# Patient Record
Sex: FEMALE | Race: WHITE | Employment: OTHER | URBAN - METROPOLITAN AREA
[De-identification: names, ages, dates, MRNs, and addresses within clinical notes are randomized per-mention and may not be internally consistent; named-entity substitution may affect disease eponyms.]

---

## 2017-05-22 PROBLEM — B37.0 ORAL CANDIDIASIS: Status: ACTIVE | Noted: 2017-05-22

## 2017-06-14 PROBLEM — B37.0 ORAL CANDIDIASIS: Status: RESOLVED | Noted: 2017-05-22 | Resolved: 2017-06-14

## 2017-11-14 ENCOUNTER — ANESTHESIA EVENT (OUTPATIENT)
Dept: SURGERY | Age: 82
End: 2017-11-14
Payer: MEDICARE

## 2017-11-14 NOTE — H&P
Outpatient Surgery History and Physical      Sen Galaviz was seen and examined. Chief Complaint:   RETAINED HARDWARE LEFT ELBOW     Physical Exam:   There were no vitals taken for this visit. Heart:   Regular rhythm      Lungs:  Are clear      History:  Past Medical History:   Diagnosis Date    COPD (chronic obstructive pulmonary disease) ([address]     daily inhaler--- no home o2-- followed by dr Angela Banuelos hypertension, benign 03/24/2015    controlled with med    Generalized osteoarthrosis, unspecified site 3/24/2015    Legal blindness     bilat    Lumbago 3/24/2015    Macular degeneration     Other malaise and fatigue 3/24/2015    Other psoriasis     on head    Pain in joint, shoulder region     Raynaud's syndrome     Right bundle branch block 3/24/2015    Seborrheic dermatitis, unspecified     Spinal stenosis, lumbar region, without neurogenic claudication 3/24/2015    Tobacco use disorder 3/24/2015    Underweight     Unspecified hearing loss 3/24/2015    Unspecified hypothyroidism 3/24/2015    Unspecified vertiginous syndromes and labyrinthine disorders     Urinary frequency 3/24/2015      Past Surgical History:   Procedure Laterality Date    HX HYSTERECTOMY      HX ORTHOPAEDIC Left 10/31/2016    elbow surg    HX ORTHOPAEDIC Bilateral 2013    hip pinning--- related to fall    HX OTHER SURGICAL      Thyroid Surgery    HX OTHER SURGICAL      colonoscopy    HX SEPTOPLASTY       Family History   Problem Relation Age of Onset    Heart Disease Father       Social History     Occupational History    Not on file.      Social History Main Topics    Smoking status: Current Every Day Smoker     Packs/day: 0.50     Years: 64.00    Smokeless tobacco: Never Used    Alcohol use Yes      Comment: occ    Drug use: No    Sexual activity: Not on file       Allergies: Reviewed per EMR  Allergies   Allergen Reactions    Adhesive Rash and Itching    Axid [Nizatidine] Rash    Bee Sting [Sting, Bee] Other (comments)     Severe swelling         Medications:    Prior to Admission medications    Medication Sig Start Date End Date Taking? Authorizing Provider   glycopyrrolate-formoterol (BEVESPI AEROSPHERE) 9-4.8 mcg HFAA Take  by inhalation two (2) times a day. Historical Provider   ipratropium-albuterol (COMBIVENT RESPIMAT)  mcg/actuation inhaler INHALE 1 PUFF FOUR TIMES A DAY  Patient taking differently: Take 1 Puff by inhalation every four (4) hours as needed. INHALE 1 PUFF FOUR TIMES A DAY 8/29/17   Nereida Deng MD   lisinopril (PRINIVIL, ZESTRIL) 20 mg tablet Take 1 Tab by mouth daily. Patient taking differently: Take 20 mg by mouth every morning. 7/11/17   Nereida Deng MD   levothyroxine (SYNTHROID) 88 mcg tablet Take 1 Tab by mouth Daily (before breakfast). 6/14/17   Nereida Deng MD   albuterol-ipratropium (DUO-NEB) 2.5 mg-0.5 mg/3 ml nebulizer solution 3 mL by Nebulization route three (3) times daily. Patient taking differently: 3 mL by Nebulization route every six (6) hours as needed. 2/26/16   Ariana Walters MD   acetaminophen (TYLENOL) 325 mg tablet Take 2 Tabs by mouth every eight (8) hours. Patient taking differently: Take 650 mg by mouth every six (6) hours as needed. 9/24/15   Joanna Dyson NP        The surgery is planned for HARDWARE REMOVAL LEFT ELBOW. The patient is here today for outpatient surgery. I have examined the patient, no changes are noted in the patient's medical status. Necessity for the procedure/care is still present and the history and physical above is current.       Signed By: Elda Martinez NP     November 14, 2017 4:45 PM

## 2017-11-15 ENCOUNTER — APPOINTMENT (OUTPATIENT)
Dept: GENERAL RADIOLOGY | Age: 82
End: 2017-11-15
Attending: ORTHOPAEDIC SURGERY
Payer: MEDICARE

## 2017-11-15 ENCOUNTER — ANESTHESIA (OUTPATIENT)
Dept: SURGERY | Age: 82
End: 2017-11-15
Payer: MEDICARE

## 2017-11-15 ENCOUNTER — HOSPITAL ENCOUNTER (OUTPATIENT)
Age: 82
Setting detail: OUTPATIENT SURGERY
Discharge: HOME | End: 2017-11-15
Attending: ORTHOPAEDIC SURGERY | Admitting: ORTHOPAEDIC SURGERY
Payer: MEDICARE

## 2017-11-15 VITALS
RESPIRATION RATE: 22 BRPM | HEART RATE: 72 BPM | SYSTOLIC BLOOD PRESSURE: 181 MMHG | DIASTOLIC BLOOD PRESSURE: 94 MMHG | HEIGHT: 67 IN | OXYGEN SATURATION: 87 % | BODY MASS INDEX: 14.91 KG/M2 | WEIGHT: 95 LBS | TEMPERATURE: 97.5 F

## 2017-11-15 DIAGNOSIS — Z96.9 RETAINED ORTHOPEDIC HARDWARE: Primary | ICD-10-CM

## 2017-11-15 PROCEDURE — 77030018836 HC SOL IRR NACL ICUM -A: Performed by: ORTHOPAEDIC SURGERY

## 2017-11-15 PROCEDURE — 88304 TISSUE EXAM BY PATHOLOGIST: CPT | Performed by: ORTHOPAEDIC SURGERY

## 2017-11-15 PROCEDURE — 76210000020 HC REC RM PH II FIRST 0.5 HR: Performed by: ORTHOPAEDIC SURGERY

## 2017-11-15 PROCEDURE — 73070 X-RAY EXAM OF ELBOW: CPT

## 2017-11-15 PROCEDURE — 77030008467 HC STPLR SKN COVD -B: Performed by: ORTHOPAEDIC SURGERY

## 2017-11-15 PROCEDURE — 76942 ECHO GUIDE FOR BIOPSY: CPT | Performed by: ORTHOPAEDIC SURGERY

## 2017-11-15 PROCEDURE — 77030020782 HC GWN BAIR PAWS FLX 3M -B: Performed by: ANESTHESIOLOGY

## 2017-11-15 PROCEDURE — 77030011640 HC PAD GRND REM COVD -A: Performed by: ORTHOPAEDIC SURGERY

## 2017-11-15 PROCEDURE — 76010000161 HC OR TIME 1 TO 1.5 HR INTENSV-TIER 1: Performed by: ORTHOPAEDIC SURGERY

## 2017-11-15 PROCEDURE — L3650 SO 8 ABD RESTRAINT PRE OTS: HCPCS | Performed by: ORTHOPAEDIC SURGERY

## 2017-11-15 PROCEDURE — 74011250636 HC RX REV CODE- 250/636: Performed by: ANESTHESIOLOGY

## 2017-11-15 PROCEDURE — 94640 AIRWAY INHALATION TREATMENT: CPT

## 2017-11-15 PROCEDURE — 74011250636 HC RX REV CODE- 250/636

## 2017-11-15 PROCEDURE — 74011000250 HC RX REV CODE- 250

## 2017-11-15 PROCEDURE — 74011250636 HC RX REV CODE- 250/636: Performed by: NURSE PRACTITIONER

## 2017-11-15 PROCEDURE — 76060000033 HC ANESTHESIA 1 TO 1.5 HR: Performed by: ORTHOPAEDIC SURGERY

## 2017-11-15 PROCEDURE — 76210000016 HC OR PH I REC 1 TO 1.5 HR: Performed by: ORTHOPAEDIC SURGERY

## 2017-11-15 PROCEDURE — 74011000250 HC RX REV CODE- 250: Performed by: ANESTHESIOLOGY

## 2017-11-15 PROCEDURE — 77030003602 HC NDL NRV BLK BBMI -B: Performed by: ANESTHESIOLOGY

## 2017-11-15 PROCEDURE — 76010010054 HC POST OP PAIN BLOCK: Performed by: ORTHOPAEDIC SURGERY

## 2017-11-15 RX ORDER — SODIUM CHLORIDE, SODIUM LACTATE, POTASSIUM CHLORIDE, CALCIUM CHLORIDE 600; 310; 30; 20 MG/100ML; MG/100ML; MG/100ML; MG/100ML
75 INJECTION, SOLUTION INTRAVENOUS
Status: DISCONTINUED | OUTPATIENT
Start: 2017-11-15 | End: 2017-11-15 | Stop reason: HOSPADM

## 2017-11-15 RX ORDER — CEFAZOLIN SODIUM IN 0.9 % NACL 2 G/50 ML
2 INTRAVENOUS SOLUTION, PIGGYBACK (ML) INTRAVENOUS
Status: COMPLETED | OUTPATIENT
Start: 2017-11-15 | End: 2017-11-15

## 2017-11-15 RX ORDER — SODIUM CHLORIDE, SODIUM LACTATE, POTASSIUM CHLORIDE, CALCIUM CHLORIDE 600; 310; 30; 20 MG/100ML; MG/100ML; MG/100ML; MG/100ML
75 INJECTION, SOLUTION INTRAVENOUS CONTINUOUS
Status: DISCONTINUED | OUTPATIENT
Start: 2017-11-15 | End: 2017-11-15 | Stop reason: HOSPADM

## 2017-11-15 RX ORDER — IPRATROPIUM BROMIDE AND ALBUTEROL SULFATE 2.5; .5 MG/3ML; MG/3ML
3 SOLUTION RESPIRATORY (INHALATION)
Status: COMPLETED | OUTPATIENT
Start: 2017-11-15 | End: 2017-11-15

## 2017-11-15 RX ORDER — PROPOFOL 10 MG/ML
INJECTION, EMULSION INTRAVENOUS
Status: DISCONTINUED | OUTPATIENT
Start: 2017-11-15 | End: 2017-11-15 | Stop reason: HOSPADM

## 2017-11-15 RX ORDER — LIDOCAINE HYDROCHLORIDE 20 MG/ML
INJECTION, SOLUTION EPIDURAL; INFILTRATION; INTRACAUDAL; PERINEURAL AS NEEDED
Status: DISCONTINUED | OUTPATIENT
Start: 2017-11-15 | End: 2017-11-15 | Stop reason: HOSPADM

## 2017-11-15 RX ORDER — LIDOCAINE HYDROCHLORIDE 10 MG/ML
0.1 INJECTION INFILTRATION; PERINEURAL AS NEEDED
Status: DISCONTINUED | OUTPATIENT
Start: 2017-11-15 | End: 2017-11-15 | Stop reason: HOSPADM

## 2017-11-15 RX ORDER — MIDAZOLAM HYDROCHLORIDE 1 MG/ML
2 INJECTION, SOLUTION INTRAMUSCULAR; INTRAVENOUS ONCE
Status: DISCONTINUED | OUTPATIENT
Start: 2017-11-15 | End: 2017-11-15 | Stop reason: HOSPADM

## 2017-11-15 RX ORDER — FENTANYL CITRATE 50 UG/ML
100 INJECTION, SOLUTION INTRAMUSCULAR; INTRAVENOUS ONCE
Status: COMPLETED | OUTPATIENT
Start: 2017-11-15 | End: 2017-11-15

## 2017-11-15 RX ORDER — HYDROMORPHONE HYDROCHLORIDE 2 MG/ML
0.5 INJECTION, SOLUTION INTRAMUSCULAR; INTRAVENOUS; SUBCUTANEOUS
Status: DISCONTINUED | OUTPATIENT
Start: 2017-11-15 | End: 2017-11-15 | Stop reason: HOSPADM

## 2017-11-15 RX ORDER — ROPIVACAINE HYDROCHLORIDE 5 MG/ML
INJECTION, SOLUTION EPIDURAL; INFILTRATION; PERINEURAL AS NEEDED
Status: DISCONTINUED | OUTPATIENT
Start: 2017-11-15 | End: 2017-11-15 | Stop reason: HOSPADM

## 2017-11-15 RX ORDER — PROPOFOL 10 MG/ML
INJECTION, EMULSION INTRAVENOUS AS NEEDED
Status: DISCONTINUED | OUTPATIENT
Start: 2017-11-15 | End: 2017-11-15 | Stop reason: HOSPADM

## 2017-11-15 RX ORDER — OXYCODONE HYDROCHLORIDE 5 MG/1
5 TABLET ORAL
Status: DISCONTINUED | OUTPATIENT
Start: 2017-11-15 | End: 2017-11-15 | Stop reason: HOSPADM

## 2017-11-15 RX ORDER — OXYCODONE HYDROCHLORIDE 5 MG/1
10 TABLET ORAL
Status: DISCONTINUED | OUTPATIENT
Start: 2017-11-15 | End: 2017-11-15 | Stop reason: HOSPADM

## 2017-11-15 RX ORDER — METOPROLOL TARTRATE 5 MG/5ML
2.5 INJECTION INTRAVENOUS ONCE
Status: COMPLETED | OUTPATIENT
Start: 2017-11-15 | End: 2017-11-15

## 2017-11-15 RX ADMIN — FENTANYL CITRATE 100 MCG: 50 INJECTION INTRAMUSCULAR; INTRAVENOUS at 10:32

## 2017-11-15 RX ADMIN — PROPOFOL 75 MCG/KG/MIN: 10 INJECTION, EMULSION INTRAVENOUS at 11:49

## 2017-11-15 RX ADMIN — LIDOCAINE HYDROCHLORIDE 40 MG: 20 INJECTION, SOLUTION EPIDURAL; INFILTRATION; INTRACAUDAL; PERINEURAL at 11:49

## 2017-11-15 RX ADMIN — PROPOFOL 30 MG: 10 INJECTION, EMULSION INTRAVENOUS at 11:49

## 2017-11-15 RX ADMIN — CEFAZOLIN 2 G: 1 INJECTION, POWDER, FOR SOLUTION INTRAMUSCULAR; INTRAVENOUS; PARENTERAL at 11:51

## 2017-11-15 RX ADMIN — SODIUM CHLORIDE, SODIUM LACTATE, POTASSIUM CHLORIDE, AND CALCIUM CHLORIDE 75 ML/HR: 600; 310; 30; 20 INJECTION, SOLUTION INTRAVENOUS at 08:04

## 2017-11-15 RX ADMIN — ROPIVACAINE HYDROCHLORIDE 30 ML: 5 INJECTION, SOLUTION EPIDURAL; INFILTRATION; PERINEURAL at 10:26

## 2017-11-15 RX ADMIN — IPRATROPIUM BROMIDE AND ALBUTEROL SULFATE 3 ML: 2.5; .5 SOLUTION RESPIRATORY (INHALATION) at 13:48

## 2017-11-15 RX ADMIN — PROPOFOL 10 MG: 10 INJECTION, EMULSION INTRAVENOUS at 12:02

## 2017-11-15 RX ADMIN — METOPROLOL TARTRATE 2.5 MG: 5 INJECTION INTRAVENOUS at 13:52

## 2017-11-15 NOTE — IP AVS SNAPSHOT
[address] [address] 
[phone] Patient: Elaine Long MRN: [id number] BGM:[birth year] About your hospitalization You were admitted on:  November 15, 2017 You last received care in the:  [facility] PACU You were discharged on:  November 15, 2017 Why you were hospitalized Your primary diagnosis was:  Not on File Things You Need To Do (next 8 weeks) Follow up with Reinier Grimes MD  
  
Phone:  [phone] Where:  [address] [address] [facility] [address] Tuesday Nov 28, 2017 Follow up with Binu Hernandez MD  
@ 6749 Phone:  [phone] Where:  [address], [address], [facility] [address] Discharge Orders Procedure Order Date Status Priority Quantity Spec Type Associated Dx CALL YOUR DOCTOR For: Severe uncontrolled pain. , Redness, tenderness, or signs of infection. 11/15/17 0828 Normal Routine 1  Retained orthopedic hardware [[id number]] Questions: For:  Severe uncontrolled pain. For:  Redness, tenderness, or signs of infection. ACTIVITY AFTER DISCHARGE Patient should: Restrict lifting 11/15/17 0828 Normal Routine 1  Retained orthopedic hardware [[id number]] Questions: Patient should:  Restrict lifting DIET REGULAR No added salt 11/15/17 0828 Normal Routine 1  Retained orthopedic hardware [[id number]] Questions: Additional options:  No added salt DRESSING, DO NOT REMOVE 11/15/17 0828 Normal Routine 1  Retained orthopedic hardware [[id number]] Comments:  Keep clean, dry and intact until next clinic visit. A check celestine indicates which time of day the medication should be taken. My Medications TAKE these medications as instructed Instructions Each Dose to Equal  
 Morning Noon Evening Bedtime  
 acetaminophen 325 mg tablet Commonly known as:  TYLENOL  
   
 Your last dose was: Your next dose is: Take 2 Tabs by mouth every eight (8) hours. 650 mg  
    
   
   
   
  
 * albuterol-ipratropium 2.5 mg-0.5 mg/3 ml Nebu Commonly known as:  Jefry Mayberry Your last dose was: Your next dose is:    
   
   
 3 mL by Nebulization route three (3) times daily. 3 mL * ipratropium-albuterol  mcg/actuation inhaler Commonly known as:  Darrick Bentleyam Your last dose was: Your next dose is:    
   
   
 INHALE 1 PUFF FOUR TIMES A DAY BEVESPI AEROSPHERE 9-4.8 mcg Hfaa Generic drug:  glycopyrrolate-formoterol Your last dose was: Your next dose is: Take  by inhalation two (2) times a day. levothyroxine 88 mcg tablet Commonly known as:  SYNTHROID Your last dose was: Your next dose is: Take 1 Tab by mouth Daily (before breakfast). 88 mcg  
    
   
   
   
  
 lisinopril 20 mg tablet Commonly known as:  Margarita [place] Your last dose was: Your next dose is: Take 1 Tab by mouth daily. 20 mg  
    
   
   
   
  
 * Notice: This list has 2 medication(s) that are the same as other medications prescribed for you. Read the directions carefully, and ask your doctor or other care provider to review them with you. Discharge Instructions NO LIFTING WITH LEFT ARM 
LEFT ARM SLING 
ELEVATE LEFT ARM  
KEEP DRESSING CLEAN AND DRY FOLLOW-UP APPT WITH DR Benton Rosales - 11/28/17 @ 10:20 AM 
PRESCRIPTION FOR OXYCODONE 5 MG GIVEN TO PATIENT 
ACTIVITY · As tolerated and as directed by your doctor. · You may shower in 24 hours. Do not take a bath until cleared by MD.  
 
DIET · Clear liquids until no nausea or vomiting; then light diet for the first day. · Advance to regular diet on second day, unless your doctor orders otherwise. · If nausea and vomiting continues, call your doctor. PAIN 
· Take pain medication as directed by your doctor. · Call your doctor if pain is NOT relieved by medication. · DO NOT take aspirin of blood thinners unless directed by your doctor. CALL YOUR DOCTOR IF  
· Excessive bleeding that does not stop after holding pressure over the area · Temperature of 101 degrees F or above · Excessive redness, swelling or bruising, and/ or green or yellow, smelly discharge from incision AFTER ANESTHESIA · For the first 24 hours: DO NOT Drive, Drink alcoholic beverages, or Make important decisions. · Be aware of dizziness following anesthesia and while taking pain medication. · Limit your activities · Do not operate hazardous machinery · If you have not urinated within 8 hours after discharge, please contact your surgeon on call. *  Please give a list of your current medications to your Primary Care Provider. *  Please update this list whenever your medications are discontinued, doses are 
    changed, or new medications (including over-the-counter products) are added. *  Please carry medication information at all times in case of emergency situations. These are general instructions for a healthy lifestyle: No smoking/ No tobacco products/ Avoid exposure to second hand smoke Surgeon General's Warning:  Quitting smoking now greatly reduces serious risk to your health. Obesity, smoking, and sedentary lifestyle greatly increases your risk for illness A healthy diet, regular physical exercise & weight monitoring are important for maintaining a healthy lifestyle You may be retaining fluid if you have a history of heart failure or if you experience any of the following symptoms:  Weight gain of 3 pounds or more overnight or 5 pounds in a week, increased swelling in our hands or feet or shortness of breath while lying flat in bed.   Please call your doctor as soon as you notice any of these symptoms; do not wait until your next office visit. Recognize signs and symptoms of STROKE: 
 
F-face looks uneven A-arms unable to move or move unevenly S-speech slurred or non-existent T-time-call 911 as soon as signs and symptoms begin-DO NOT go Back to bed or wait to see if you get better-TIME IS BRAIN. ACO Transitions of Care Introducing [employer] [address] Lilliana Lewis offers a voluntary care coordination program to provide high quality service and care to [facility] fee-for-service beneficiaries. Arpita Anderson was designed to help you enhance your health and well-being through the following services: ? Transitions of Care  support for individuals who are transitioning from one care setting to another (example: Hospital to home). ? Chronic and Complex Care Coordination  support for individuals and caregivers of those with serious or chronic illnesses or with more than one chronic (ongoing) condition and those who take a number of different medications. If you meet specific medical criteria, a [facility] [address] may call you directly to coordinate your care with your primary care physician and your other care providers. For questions about the [facility] programs, please, contact your physicians office. For general questions or additional information about Accountable Care Organizations: 
Please visit www.medicare.gov/acos. html or call 1-800-MEDICARE (9-414.576.4988) TTY users should call 8-165.977.6252. Introducing [facility]! Candelaria Allan introduces [employer] patient portal. Now you can access parts of your medical record, email your doctor's office, and request medication refills online. 1. In your internet browser, go to [email or web address] 2. Click on the First Time User? Click Here link in the Sign In box.  You will see the New Member Sign Up page. 3. Enter your [employer] Access Code exactly as it appears below. You will not need to use this code after youve completed the sign-up process. If you do not sign up before the expiration date, you must request a new code. · [employer] Access Code: [email or web address] Expires: 2/13/2018  5:26 AM 
 
4. Enter the last four digits of your Social Security Number (xxxx) and Date of Birth (mm/dd/yyyy) as indicated and click Submit. You will be taken to the next sign-up page. 5. Create a [employer] ID. This will be your [employer] login ID and cannot be changed, so think of one that is secure and easy to remember. 6. Create a [employer] password. You can change your password at any time. 7. Enter your Password Reset Question and Answer. This can be used at a later time if you forget your password. 8. Enter your e-mail address. You will receive e-mail notification when new information is available in [address]. 9. Click Sign Up. You can now view and download portions of your medical record. 10. Click the Download Summary menu link to download a portable copy of your medical information. If you have questions, please visit the Frequently Asked Questions section of the [employer] website. Remember, [employer] is NOT to be used for urgent needs. For medical emergencies, dial 911. Now available from your iPhone and Android! Unresulted Labs-Please follow up with your PCP about these lab tests Order Current Status XR ELBOW LT AP/LAT In process Providers Seen During Your Hospitalization Provider Specialty Primary office phone Sophia Hernandez MD Orthopedic Surgery [phone] Your Primary Care Physician (PCP) Primary Care Physician Office Phone Office Fax [address], [address] [phone] You are allergic to the following Allergen Reactions Adhesive Rash Itching Axid (Nizatidine) Rash Bee Sting (Sting, Bee) Other (comments) Severe swelling Recent Documentation Height Weight BMI OB Status Smoking Status 1.702 m 43.1 kg 14.88 kg/m2 Hysterectomy Current Every Day Smoker Emergency Contacts Name Discharge Info Relation Home Work Mobile Akin Myrick  Spouse [3] [phone] Kaushik Lira  Child [2] [phone] Patient Belongings The following personal items are in your possession at time of discharge: 
  Dental Appliances: None  Visual Aid: Glasses      Home Medications: None   Jewelry: None  Clothing: Shirt, Pants, Socks, Undergarments, Footwear, Sweater    Other Valuables: None Please provide this summary of care documentation to your next provider. Signatures-by signing, you are acknowledging that this After Visit Summary has been reviewed with you and you have received a copy. Patient Signature:  ____________________________________________________________ Date:  ____________________________________________________________  
  
Josph Perfect Provider Signature:  ____________________________________________________________ Date:  ____________________________________________________________

## 2017-11-15 NOTE — DISCHARGE INSTRUCTIONS
NO LIFTING WITH LEFT ARM  LEFT ARM SLING  ELEVATE LEFT ARM   KEEP DRESSING CLEAN AND DRY  FOLLOW-UP APPT WITH DR Natalee Rodriguez - 11/28/17 @ 10:20 AM  PRESCRIPTION FOR OXYCODONE 5 MG GIVEN TO PATIENT  ACTIVITY  · As tolerated and as directed by your doctor. · You may shower in 24 hours. Do not take a bath until cleared by MD.     DIET  · Clear liquids until no nausea or vomiting; then light diet for the first day. · Advance to regular diet on second day, unless your doctor orders otherwise. · If nausea and vomiting continues, call your doctor. PAIN  · Take pain medication as directed by your doctor. · Call your doctor if pain is NOT relieved by medication. · DO NOT take aspirin of blood thinners unless directed by your doctor. CALL YOUR DOCTOR IF   · Excessive bleeding that does not stop after holding pressure over the area  · Temperature of 101 degrees F or above  · Excessive redness, swelling or bruising, and/ or green or yellow, smelly discharge from incision    AFTER ANESTHESIA   · For the first 24 hours: DO NOT Drive, Drink alcoholic beverages, or Make important decisions. · Be aware of dizziness following anesthesia and while taking pain medication. · Limit your activities  · Do not operate hazardous machinery  · If you have not urinated within 8 hours after discharge, please contact your surgeon on call. *  Please give a list of your current medications to your Primary Care Provider. *  Please update this list whenever your medications are discontinued, doses are      changed, or new medications (including over-the-counter products) are added. *  Please carry medication information at all times in case of emergency situations. These are general instructions for a healthy lifestyle:  No smoking/ No tobacco products/ Avoid exposure to second hand smoke  Surgeon General's Warning:  Quitting smoking now greatly reduces serious risk to your health.   Obesity, smoking, and sedentary lifestyle greatly increases your risk for illness  A healthy diet, regular physical exercise & weight monitoring are important for maintaining a healthy lifestyle    You may be retaining fluid if you have a history of heart failure or if you experience any of the following symptoms:  Weight gain of 3 pounds or more overnight or 5 pounds in a week, increased swelling in our hands or feet or shortness of breath while lying flat in bed. Please call your doctor as soon as you notice any of these symptoms; do not wait until your next office visit. Recognize signs and symptoms of STROKE:    F-face looks uneven  A-arms unable to move or move unevenly  S-speech slurred or non-existent  T-time-call 911 as soon as signs and symptoms begin-DO NOT go       Back to bed or wait to see if you get better-TIME IS BRAIN.

## 2017-11-15 NOTE — ANESTHESIA PREPROCEDURE EVALUATION
Anesthetic History   No history of anesthetic complications            Review of Systems / Medical History  Patient summary reviewed, nursing notes reviewed and pertinent labs reviewed    Pulmonary    COPD (no home O2, stable): mild      Smoker         Neuro/Psych         Psychiatric history    Comments: Spinal stenosis Cardiovascular    Hypertension: well controlled              Exercise tolerance: <4 METS     GI/Hepatic/Renal  Within defined limits              Endo/Other      Hypothyroidism  Arthritis and anemia     Other Findings   Comments: Raynaud's         Physical Exam    Airway  Mallampati: II  TM Distance: 4 - 6 cm  Neck ROM: normal range of motion   Mouth opening: Normal     Cardiovascular  Regular rate and rhythm,  S1 and S2 normal,  no murmur, click, rub, or gallop  Rhythm: regular  Rate: normal        Comments: Slightly tachy Dental    Dentition: Full upper dentures and Full lower dentures     Pulmonary            Prolonged expiration     Abdominal  GI exam deferred       Other Findings            Anesthetic Plan    ASA: 3  Anesthesia type: total IV anesthesia - supraclavicular block      Post-op pain plan if not by surgeon: peripheral nerve block single    Induction: Intravenous  Anesthetic plan and risks discussed with: Patient and Family

## 2017-11-15 NOTE — BRIEF OP NOTE
BRIEF OPERATIVE NOTE    Date of Procedure: 11/15/2017   Preoperative Diagnosis: Presence of retained hardware [Z96.9]  Postoperative Diagnosis: Presence of retained hardware [Z96.9]                        Mechanical complication deep hardware left elbow/Ganglion cyst left elbow        Procedure(s):  DEEP HARDWARE REMOVAL LEFT ELBOW  EXCISION OF GANGLION CYST LEFT ELBOW    Surgeon(s) and Role:     * Shashank Venegas MD - Primary         Assistant Staff:       Surgical Staff:  Circ-1: Miki Mcgarry RN  Circ-Relief: Melanie Bright RN  Scrub Tech-1: Ameya Calle  Scrub Tech-2: Elizabeth Mora  Scrub Tech-Relief: Wyatt Mccall  Scrub Tech-3: Radha Tucker  Event Time In   Incision Start 1202   Incision Close      Anesthesia: Regional   Estimated Blood Loss: tourniquet  Specimens:   ID Type Source Tests Collected by Time Destination   1 : Left Elbow Cyst Preservative Surgical Specimen  Shashank Venegas MD 11/15/2017 1213 Pathology      Findings: none   Complications: none  Implants: * No implants in log *

## 2017-11-15 NOTE — ANESTHESIA PROCEDURE NOTES
Peripheral Block    Start time: 11/15/2017 10:19 AM  End time: 11/15/2017 10:30 AM  Performed by: Katie Troncoso  Authorized by: Katie Troncoso       Pre-procedure:    Indications: at surgeon's request, post-op pain management and procedure for pain    Preanesthetic Checklist: patient identified, risks and benefits discussed, site marked, timeout performed, anesthesia consent given and patient being monitored    Timeout Time: 10:19          Block Type:   Block Type:  Supraclavicular  Laterality:  Left  Monitoring:  Standard ASA monitoring, continuous pulse ox, frequent vital sign checks, heart rate, oxygen and responsive to questions  Injection Technique:  Single shot  Procedures: ultrasound guided and nerve stimulator    Patient Position: supine  Prep: chlorhexidine    Location:  Interscalene  Needle Type:  Stimuplex  Needle Gauge:  21 G  Needle Localization:  Ultrasound guidance and anatomical landmarks  Motor Response: minimal motor response >0.4 mA    Medication Injected:  0.5%  ropivacaine  Volume (mL):  30    Assessment:  Number of attempts:  1  Injection Assessment:  Incremental injection every 5 mL, local visualized surrounding nerve on ultrasound, negative aspiration for blood, negative aspiration for CSF, no paresthesia, no intravascular symptoms and ultrasound image on chart  Patient tolerance:  Patient tolerated the procedure well with no immediate complications

## 2017-11-28 NOTE — OP NOTES
[facility]   OPERATIVE REPORT       Name:  Kaden Driver   MR#:  [id number]   :  1933   Account #:  [de-identified]   Date of Adm:  11/15/2017       DATE OF SURGERY: 11/15/2017    PREOPERATIVE DIAGNOSES    1. Complications of hardware, left elbow. 2. Cyst, left elbow. POSTOPERATIVE DIAGNOSES    1. Complications of hardware, left elbow. 2. Cyst, left elbow. NAME OF PROCEDURE    1. Removal of deep hardware, left elbow. 2. Excision of ganglion cyst, left elbow. SURGEON: Hiwot Montague. MD Virgilio    ANESTHESIA: General with a block for postoperative pain relief. PROCEDURE: The patient was brought to the operative suite,   placed in supine position. After adequate anesthesia was   achieved in form of general, the patient had a tourniquet   applied to left arm over adequate padding and Sof-Rol, preset to   a level of 250 mmHg. Left upper extremity was then prepped and   draped in the usual sterile fashion. It was elevated,   exsanguinated with an Esmarch. Tourniquet was inflated. Incision   was made along the line of the previous skin incision over the   olecranon. Hemostasis achieved with Bovie cautery. Fascia   incised along the line of skin incision. A small ganglion cyst   was noted at the region of the crimp on the wire. The ganglion   cyst was excised in its entirety. The wire was then cut and   removed and then the 2 K-wires were removed. The fracture was   healed. AP and lateral fluoroscopic images confirmed that the   hardware was out. The wound was then irrigated with normal   saline. It was closed in layers. A sterile compressive dressing   and arm sling were applied. The patient was then transferred to   the recovery room alert and oriented under the care of   Anesthesia. ESTIMATED BLOOD LOSS: Minimal.    SPECIMEN:     FLUIDS: See anesthesia record. CLOSURE: Primary. COMPLICATIONS: None. MD DERRICK Mitchell / HIGINIO   D:  2017 18:38   T:  11/28/2017   07:59   Job #:  [id number]

## 2018-06-19 PROBLEM — L30.9 ECZEMA: Status: ACTIVE | Noted: 2018-01-01

## (undated) DEVICE — Device

## (undated) DEVICE — X-LARGE COTTON GLOVE: Brand: DEROYAL

## (undated) DEVICE — STERILE HOOK LOCK LATEX FREE ELASTIC BANDAGE 4INX5YD: Brand: HOOK LOCK™

## (undated) DEVICE — IMMOBILIZER SHOULDER SLING SWATHE DLX

## (undated) DEVICE — REM POLYHESIVE ADULT PATIENT RETURN ELECTRODE: Brand: VALLEYLAB

## (undated) DEVICE — 2000CC GUARDIAN II: Brand: GUARDIAN

## (undated) DEVICE — BUTTON SWITCH PENCIL BLADE ELECTRODE, HOLSTER: Brand: EDGE

## (undated) DEVICE — (D)PREP SKN CHLRAPRP APPL 26ML -- CONVERT TO ITEM 371833

## (undated) DEVICE — SLIM BODY SKIN STAPLER: Brand: APPOSE ULC

## (undated) DEVICE — SOLUTION IV 1000ML 0.9% SOD CHL

## (undated) DEVICE — INTENDED FOR TISSUE SEPARATION, AND OTHER PROCEDURES THAT REQUIRE A SHARP SURGICAL BLADE TO PUNCTURE OR CUT.: Brand: BARD-PARKER ® STAINLESS STEEL BLADES